# Patient Record
Sex: FEMALE | Race: WHITE | NOT HISPANIC OR LATINO | URBAN - METROPOLITAN AREA
[De-identification: names, ages, dates, MRNs, and addresses within clinical notes are randomized per-mention and may not be internally consistent; named-entity substitution may affect disease eponyms.]

---

## 2017-01-16 ENCOUNTER — DOCUMENTATION ONLY (OUTPATIENT)
Dept: TRANSPLANT | Facility: CLINIC | Age: 37
End: 2017-01-16

## 2017-01-16 ENCOUNTER — TELEPHONE (OUTPATIENT)
Dept: TRANSPLANT | Facility: CLINIC | Age: 37
End: 2017-01-16

## 2017-01-16 NOTE — TELEPHONE ENCOUNTER
I called Eula to discuss litho link results and discussion at the donor meeting on Jan 11.  It was recommended that she decrease her NA intake and be well hydrated.  Dr. Andres recommended a repeat of Litho links in 3 months.   I will send the orders and ask nutrition to send information to Eula about low NA diet.    Melyssa will ask [facility] to send results of echo.

## 2017-01-16 NOTE — PROGRESS NOTES
Sent pt email per request of coordinator, Damaris. Provided pt with low sodium diet info and also some snack ideas. Suggested tracking her current intake x 3 days via my fitness pal janette, aiming for 2000 mg/day or less. Per review of my note 12/2016, pt does not eat out often and overall nothing jumped out at me that was high in sodium in her diet. Encouraged pt to reach out to me if she has further questions.    Marian Acosta RD, LD  Outpatient Solid Organ Transplant Dietitian  [phone]

## 2017-01-24 ENCOUNTER — TELEPHONE (OUTPATIENT)
Dept: TRANSPLANT | Facility: CLINIC | Age: 37
End: 2017-01-24

## 2017-01-24 NOTE — TELEPHONE ENCOUNTER
Yee Ocampo,    I just wanted to check in today and make sure you received my Echo from [facility].  I called and asked them to fax it to you last week.  I have a few questions too.  We are going to recheck my sodium in April.  Does my sister need to be looking for another donor?  Do you test more than one person at a time?  My brother was going to be tested before me but his BMI is too high so I went first.  Can he be tested while he tries to get his BMI down or does he need to lose weight before he can start the process?  My sister has an appointment in February.  If her numbers are worse will I be able to retake the sodium test sooner?      Thank you for taking the time to read this.  My family has lots of questions and we go round and round talking about some of them and I just thought I would start with a few from a great source. J    Thank you,  Eula Iglesias  [phone]

## 2017-04-20 ENCOUNTER — DOCUMENTATION ONLY (OUTPATIENT)
Dept: TRANSPLANT | Facility: CLINIC | Age: 37
End: 2017-04-20

## 2017-04-20 NOTE — PROGRESS NOTES
Eula,  Am wondering if you had a chance to do the 24 hour urine test?  Call me if you have questions.   Damaris   [phone]

## 2017-04-25 ENCOUNTER — TELEPHONE (OUTPATIENT)
Dept: TRANSPLANT | Facility: CLINIC | Age: 37
End: 2017-04-25

## 2017-04-25 NOTE — TELEPHONE ENCOUNTER
Savannah gutierrez called to ask where they should bill echo for abnormal ekg.   I gave them billing informaion and they will bill us

## 2017-04-26 ENCOUNTER — COMMITTEE REVIEW (OUTPATIENT)
Dept: TRANSPLANT | Facility: CLINIC | Age: 37
End: 2017-04-26

## 2017-04-26 NOTE — COMMITTEE REVIEW
Abdominal Committee Review Note     Evaluation Date:   Committee Review Date: 4/26/2017    Organ being evaluated for: Kidney    Transplant Phase:   Transplant Status:     Transplant Coordinator: No matching coordinators  Transplant Surgeon:       Referring Physician: Referred Self    Primary Diagnosis:   Secondary Diagnosis:     Committee Review Members:  Nephrology Asif Andres MD   Nutrition Althea Acosta, RD   Pharmacy Lester Saxena, [facility]    - Clinical Selma Rubio, [facility], oDmonique Nelson, [facility]   Transplant Damaris Washington, Kimberly Christina, SELENA, Sophie Hallman LPN, Mónica Arboleda, SELENA, Ismael Mckeon MD, Larry Hammonds MD   Transplant Surgery Edel Robins NP       Transplant Eligibility: Acceptable Mental Health, stone former    Committee Review Decision: Declined    Relative Contraindications: Other, bilateral stones    Absolute Contraindications:     Committee Chair Larry Hammonds MD verbally attested to the committee's decision.    Committee Discussion Details: bilateral stones.  Sone former on 2 litho links

## 2017-04-26 NOTE — TELEPHONE ENCOUNTER
I called Eula and let her know that based on her litho link x2 and bilateral kidney stones she is not able to be a donor.  She was disappointed.   I offered information on how a living donor and she said she knew how to help her sister find a donor.   I thank catie her for coming forward

## 2017-04-27 ENCOUNTER — TELEPHONE (OUTPATIENT)
Dept: TRANSPLANT | Facility: CLINIC | Age: 37
End: 2017-04-27

## 2017-04-27 NOTE — TELEPHONE ENCOUNTER
Eula,  Thank you for your note.  I appreciate your feed back.  I will communicate your concerns to the nephrologists that made the decision.  We will continue to look for  a kidney for your sister.  Damaris     From: EULA MACARIO [[email or web address]]   Sent: Thursday, April 27, 2017 9:33 AM  To: Damaris Washington  Subject: Thank you    Hi Damaris,    Thank you for calling yesterday.  Probably not something that s easy to do.  It was such a shock.  I thought that I had to lower my sodium and I would be good to go.  I thought on that test result it said I went from 224 down to 116 so I was thinking it was going to be good news.  I didn t know I had 2 kidney stones.  We only talked about one tiny one and that we would give that one to my sister.  I guess I don t understand why they didn t just tell me in January that I couldn t donate.  I didn t know it was going to be this hard to hear and that extra time thinking I had a good shot probably wasn t the best.      Anyways, I think I just needed to say that so it can stop going around and around in my head.  Thank you so much for all your help the past 6 months.  I appreciate all you did.    Thanks again,  Eula Macario

## 2018-01-07 ENCOUNTER — HEALTH MAINTENANCE LETTER (OUTPATIENT)
Age: 38
End: 2018-01-07

## 2018-02-26 ENCOUNTER — ANESTHESIA EVENT (OUTPATIENT)
Dept: GASTROENTEROLOGY | Facility: CLINIC | Age: 38
End: 2018-02-26
Payer: COMMERCIAL

## 2018-02-27 ENCOUNTER — ANESTHESIA (OUTPATIENT)
Dept: GASTROENTEROLOGY | Facility: CLINIC | Age: 38
End: 2018-02-27
Payer: COMMERCIAL

## 2018-02-27 ENCOUNTER — HOSPITAL ENCOUNTER (OUTPATIENT)
Facility: CLINIC | Age: 38
Discharge: HOME | End: 2018-02-27
Attending: INTERNAL MEDICINE | Admitting: INTERNAL MEDICINE
Payer: COMMERCIAL

## 2018-02-27 ENCOUNTER — SURGERY (OUTPATIENT)
Age: 38
End: 2018-02-27

## 2018-02-27 VITALS
HEIGHT: 63 IN | SYSTOLIC BLOOD PRESSURE: 105 MMHG | WEIGHT: 178 LBS | BODY MASS INDEX: 31.54 KG/M2 | RESPIRATION RATE: 14 BRPM | OXYGEN SATURATION: 99 % | DIASTOLIC BLOOD PRESSURE: 68 MMHG

## 2018-02-27 LAB
HCG UR QL: NEGATIVE
UPPER EUS: NORMAL

## 2018-02-27 PROCEDURE — 25000128 H RX IP 250 OP 636: Performed by: INTERNAL MEDICINE

## 2018-02-27 PROCEDURE — 25000128 H RX IP 250 OP 636: Performed by: NURSE ANESTHETIST, CERTIFIED REGISTERED

## 2018-02-27 PROCEDURE — 25000125 ZZHC RX 250: Performed by: NURSE ANESTHETIST, CERTIFIED REGISTERED

## 2018-02-27 PROCEDURE — 43259 EGD US EXAM DUODENUM/JEJUNUM: CPT | Performed by: INTERNAL MEDICINE

## 2018-02-27 PROCEDURE — 40000010 ZZH STATISTIC ANES STAT CODE-CRNA PER MINUTE: Performed by: INTERNAL MEDICINE

## 2018-02-27 PROCEDURE — 81025 URINE PREGNANCY TEST: CPT | Performed by: INTERNAL MEDICINE

## 2018-02-27 PROCEDURE — 37000008 ZZH ANESTHESIA TECHNICAL FEE, 1ST 30 MIN: Performed by: INTERNAL MEDICINE

## 2018-02-27 RX ORDER — SODIUM CHLORIDE, SODIUM LACTATE, POTASSIUM CHLORIDE, CALCIUM CHLORIDE 600; 310; 30; 20 MG/100ML; MG/100ML; MG/100ML; MG/100ML
INJECTION, SOLUTION INTRAVENOUS CONTINUOUS PRN
Status: DISCONTINUED | OUTPATIENT
Start: 2018-02-27 | End: 2018-02-27 | Stop reason: HOSPADM

## 2018-02-27 RX ORDER — PROPOFOL 10 MG/ML
INJECTION, EMULSION INTRAVENOUS CONTINUOUS PRN
Status: DISCONTINUED | OUTPATIENT
Start: 2018-02-27 | End: 2018-02-27

## 2018-02-27 RX ORDER — FLUMAZENIL 0.1 MG/ML
0.2 INJECTION, SOLUTION INTRAVENOUS
Status: DISCONTINUED | OUTPATIENT
Start: 2018-02-27 | End: 2018-02-27 | Stop reason: HOSPADM

## 2018-02-27 RX ORDER — ONDANSETRON 2 MG/ML
4 INJECTION INTRAMUSCULAR; INTRAVENOUS EVERY 6 HOURS PRN
Status: DISCONTINUED | OUTPATIENT
Start: 2018-02-27 | End: 2018-02-27 | Stop reason: HOSPADM

## 2018-02-27 RX ORDER — LIDOCAINE 40 MG/G
CREAM TOPICAL
Status: DISCONTINUED | OUTPATIENT
Start: 2018-02-27 | End: 2018-02-27 | Stop reason: HOSPADM

## 2018-02-27 RX ORDER — ONDANSETRON 2 MG/ML
INJECTION INTRAMUSCULAR; INTRAVENOUS PRN
Status: DISCONTINUED | OUTPATIENT
Start: 2018-02-27 | End: 2018-02-27

## 2018-02-27 RX ORDER — NALOXONE HYDROCHLORIDE 0.4 MG/ML
.1-.4 INJECTION, SOLUTION INTRAMUSCULAR; INTRAVENOUS; SUBCUTANEOUS
Status: DISCONTINUED | OUTPATIENT
Start: 2018-02-27 | End: 2018-02-27 | Stop reason: HOSPADM

## 2018-02-27 RX ORDER — FENTANYL CITRATE 50 UG/ML
INJECTION, SOLUTION INTRAMUSCULAR; INTRAVENOUS PRN
Status: DISCONTINUED | OUTPATIENT
Start: 2018-02-27 | End: 2018-02-27

## 2018-02-27 RX ORDER — LIDOCAINE HYDROCHLORIDE 20 MG/ML
INJECTION, SOLUTION INFILTRATION; PERINEURAL PRN
Status: DISCONTINUED | OUTPATIENT
Start: 2018-02-27 | End: 2018-02-27

## 2018-02-27 RX ORDER — PROPOFOL 10 MG/ML
INJECTION, EMULSION INTRAVENOUS PRN
Status: DISCONTINUED | OUTPATIENT
Start: 2018-02-27 | End: 2018-02-27

## 2018-02-27 RX ORDER — ONDANSETRON 4 MG/1
4 TABLET, ORALLY DISINTEGRATING ORAL EVERY 6 HOURS PRN
Status: DISCONTINUED | OUTPATIENT
Start: 2018-02-27 | End: 2018-02-27 | Stop reason: HOSPADM

## 2018-02-27 RX ORDER — SODIUM CHLORIDE, SODIUM LACTATE, POTASSIUM CHLORIDE, CALCIUM CHLORIDE 600; 310; 30; 20 MG/100ML; MG/100ML; MG/100ML; MG/100ML
INJECTION, SOLUTION INTRAVENOUS CONTINUOUS PRN
Status: DISCONTINUED | OUTPATIENT
Start: 2018-02-27 | End: 2018-02-27

## 2018-02-27 RX ADMIN — ONDANSETRON 4 MG: 2 INJECTION INTRAMUSCULAR; INTRAVENOUS at 14:17

## 2018-02-27 RX ADMIN — SODIUM CHLORIDE, POTASSIUM CHLORIDE, SODIUM LACTATE AND CALCIUM CHLORIDE: 600; 310; 30; 20 INJECTION, SOLUTION INTRAVENOUS at 14:08

## 2018-02-27 RX ADMIN — SODIUM CHLORIDE, SODIUM LACTATE, POTASSIUM CHLORIDE, CALCIUM CHLORIDE 500 ML: 600; 310; 30; 20 INJECTION, SOLUTION INTRAVENOUS at 13:08

## 2018-02-27 RX ADMIN — LIDOCAINE HYDROCHLORIDE 20 MG: 20 INJECTION, SOLUTION INFILTRATION; PERINEURAL at 14:11

## 2018-02-27 RX ADMIN — PROPOFOL 125 MCG/KG/MIN: 10 INJECTION, EMULSION INTRAVENOUS at 14:12

## 2018-02-27 RX ADMIN — FENTANYL CITRATE 50 MCG: 50 INJECTION, SOLUTION INTRAMUSCULAR; INTRAVENOUS at 14:12

## 2018-02-27 RX ADMIN — LIDOCAINE HYDROCHLORIDE 80 MG: 20 INJECTION, SOLUTION INFILTRATION; PERINEURAL at 14:18

## 2018-02-27 RX ADMIN — PROPOFOL 20 MG: 10 INJECTION, EMULSION INTRAVENOUS at 14:16

## 2018-02-27 RX ADMIN — MIDAZOLAM 2 MG: 1 INJECTION INTRAMUSCULAR; INTRAVENOUS at 14:08

## 2018-02-27 ASSESSMENT — LIFESTYLE VARIABLES: TOBACCO_USE: 0

## 2018-02-27 NOTE — ANESTHESIA POSTPROCEDURE EVALUATION
Patient: Eula Iglesias    Procedure(s):  EUS WITH FINE NEEDLE ASPIRATION - Wound Class: II-Clean Contaminated    Diagnosis:PANCREAS LESION  Diagnosis Additional Information: No value filed.    Anesthesia Type:  MAC    Note:  Anesthesia Post Evaluation    Patient location during evaluation: PACU  Patient participation: Able to fully participate in evaluation  Level of consciousness: awake and alert  Pain management: satisfactory to patient  Airway patency: patent  Cardiovascular status: hemodynamically stable  Respiratory status: acceptable and unassisted  Hydration status: balanced  PONV: none     Anesthetic complications: None          Last vitals:  Vitals:    02/27/18 1440 02/27/18 1444 02/27/18 1450   BP: 115/84  155/87   Resp: 17 16 14   SpO2: 97% 98% 98%         Electronically Signed By: Jagdish Andrews MD  February 27, 2018  3:05 PM

## 2018-02-27 NOTE — ANESTHESIA CARE TRANSFER NOTE
Patient: Eula Iglesias    Procedure(s):  EUS WITH FINE NEEDLE ASPIRATION - Wound Class: II-Clean Contaminated    Diagnosis: PANCREAS LESION  Diagnosis Additional Information: No value filed.    Anesthesia Type:   MAC     Note:  Airway :Room Air  Patient transferred to:Phase II  Comments: Patient appropriate and following commands. VSS. Report given to RN.Handoff Report: Identifed the Patient, Identified the Reponsible Provider, Reviewed the pertinent medical history, Discussed the surgical course, Reviewed Intra-OP anesthesia mangement and issues during anesthesia, Set expectations for post-procedure period and Allowed opportunity for questions and acknowledgement of understanding      Vitals: (Last set prior to Anesthesia Care Transfer)    CRNA VITALS  2/27/2018 1402 - 2/27/2018 1436      2/27/2018             NIBP: 95/57    Pulse: 70    NIBP Mean: 74    Ht Rate: 70    SpO2: 99 %    Resp Rate (set): 10                Electronically Signed By: ALTHEA Lindquist CRNA  February 27, 2018  2:36 PM

## 2018-02-27 NOTE — ANESTHESIA PREPROCEDURE EVALUATION
Anesthesia Evaluation     . Pt has had prior anesthetic.     No history of anesthetic complications          ROS/MED HX    ENT/Pulmonary:      (-) tobacco use and sleep apnea   Neurologic:     (+)migraines,     Cardiovascular:         METS/Exercise Tolerance:     Hematologic:         Musculoskeletal:         GI/Hepatic:     (+) Other GI/Hepatic pancreatic mass     (-) GERD and liver disease   Renal/Genitourinary:     (+) Nephrolithiasis ,       Endo:      (-) Type II DM and thyroid disease   Psychiatric:     (+) psychiatric history anxiety      Infectious Disease:         Malignancy:         Other:                     Physical Exam  Normal systems: cardiovascular, pulmonary and dental    Airway   Mallampati: II  TM distance: >3 FB  Neck ROM: full    Dental     Cardiovascular       Pulmonary                     Anesthesia Plan      History & Physical Review  History and physical reviewed and following examination; no interval change.    ASA Status:  2 .    NPO Status:  > 8 hours    Plan for MAC Reason for MAC:  Deep or markedly invasive procedure (G8)  PONV prophylaxis:  Ondansetron (or other 5HT-3)       Postoperative Care      Consents  Anesthetic plan, risks, benefits and alternatives discussed with:  Patient and Spouse..        Procedure: Procedure(s):  COMBINED ENDOSCOPIC ULTRASOUND, ESOPHAGOSCOPY, GASTROSCOPY, DUODENOSCOPY (EGD), FINE NEEDLE ASPIRATE/BIOPSY  Preop diagnosis: PANCREAS LESION    No Known Allergies  Past Medical History:   Diagnosis Date     Abnormal Pap smear of cervix      Arthritis     prev on methotrexate, resolved with gluten free diet     Depression      Headache, migraine      Kidney stones      Post partum depression      Past Surgical History:   Procedure Laterality Date      SECTION      x 2     MAMMOPLASTY REDUCTION BILATERAL       Prior to Admission medications    Medication Sig Start Date End Date Taking? Authorizing Provider   aspirin-acetaminophen-caffeine (EXCEDRIN  MIGRAINE) 250-250-65 MG per tablet    Yes Reported, Patient   tazarotene (TAZORAC) 0.1 % topical gel    Yes Reported, Patient   Eletriptan Hydrobromide (RELPAX PO) Take 20 mg by mouth once as needed for migraine    Reported, Patient   fexofenadine (ALLERGY RELIEF) 180 MG tablet     Reported, Patient   medroxyPROGESTERone (DEPO-PROVERA) 150 MG/ML injection     Reported, Patient   eletriptan (RELPAX) 20 MG tablet  5/12/16   Reported, Patient     Current Facility-Administered Medications Ordered in Epic   Medication Dose Route Frequency Last Rate Last Dose     lidocaine 1 % 1 mL  1 mL Other Q1H PRN         lidocaine (LMX4) cream   Topical Q1H PRN         sodium chloride (PF) 0.9% PF flush 3 mL  3 mL Intracatheter Q1H PRN         sodium chloride (PF) 0.9% PF flush 3 mL  3 mL Intracatheter Q8H         lactated ringers infusion    Continuous  mL/hr at 02/27/18 1308 500 mL at 02/27/18 1308     No current [facility]-ordered outpatient prescriptions on file.     Wt Readings from Last 1 Encounters:   02/27/18 80.7 kg (178 lb)     Temp Readings from Last 1 Encounters:   12/09/16 36.5  C (97.7  F) (Oral)     BP Readings from Last 6 Encounters:   02/27/18 122/80   12/09/16 116/74   12/09/16 114/75   12/09/16 122/68     Pulse Readings from Last 4 Encounters:   12/09/16 86     Resp Readings from Last 1 Encounters:   02/27/18 16     SpO2 Readings from Last 1 Encounters:   02/27/18 97%     Recent Labs   Lab Test  12/09/16   0806   NA  142   POTASSIUM  3.7   CHLORIDE  109   CO2  21   ANIONGAP  12   GLC  89   BUN  8   CR  0.81   ROD  9.7     Recent Labs   Lab Test  12/09/16   0806   WBC  6.6   HGB  15.3   PLT  335     Recent Labs   Lab Test  12/09/16   0806   INR  1.03      RECENT LABS:   ECG:   ECHO:   CXR:                      .

## 2019-02-28 ENCOUNTER — TRANSFERRED RECORDS (OUTPATIENT)
Dept: HEALTH INFORMATION MANAGEMENT | Facility: CLINIC | Age: 39
End: 2019-02-28

## 2019-03-05 ENCOUNTER — ANESTHESIA (OUTPATIENT)
Dept: GASTROENTEROLOGY | Facility: CLINIC | Age: 39
End: 2019-03-05
Payer: COMMERCIAL

## 2019-03-05 ENCOUNTER — HOSPITAL ENCOUNTER (OUTPATIENT)
Facility: CLINIC | Age: 39
Discharge: HOME | End: 2019-03-05
Attending: INTERNAL MEDICINE | Admitting: INTERNAL MEDICINE
Payer: COMMERCIAL

## 2019-03-05 ENCOUNTER — ANESTHESIA EVENT (OUTPATIENT)
Dept: GASTROENTEROLOGY | Facility: CLINIC | Age: 39
End: 2019-03-05
Payer: COMMERCIAL

## 2019-03-05 VITALS
OXYGEN SATURATION: 98 % | BODY MASS INDEX: 31.58 KG/M2 | RESPIRATION RATE: 21 BRPM | DIASTOLIC BLOOD PRESSURE: 70 MMHG | SYSTOLIC BLOOD PRESSURE: 96 MMHG | HEART RATE: 60 BPM | HEIGHT: 64 IN | WEIGHT: 185 LBS

## 2019-03-05 LAB
HCG UR QL: NEGATIVE
UPPER EUS: NORMAL

## 2019-03-05 PROCEDURE — 81025 URINE PREGNANCY TEST: CPT | Performed by: INTERNAL MEDICINE

## 2019-03-05 PROCEDURE — 25000128 H RX IP 250 OP 636: Performed by: NURSE ANESTHETIST, CERTIFIED REGISTERED

## 2019-03-05 PROCEDURE — 40000010 ZZH STATISTIC ANES STAT CODE-CRNA PER MINUTE: Performed by: INTERNAL MEDICINE

## 2019-03-05 PROCEDURE — 25800030 ZZH RX IP 258 OP 636: Performed by: NURSE ANESTHETIST, CERTIFIED REGISTERED

## 2019-03-05 PROCEDURE — 25000125 ZZHC RX 250: Performed by: NURSE ANESTHETIST, CERTIFIED REGISTERED

## 2019-03-05 PROCEDURE — 43259 EGD US EXAM DUODENUM/JEJUNUM: CPT | Performed by: INTERNAL MEDICINE

## 2019-03-05 PROCEDURE — 37000008 ZZH ANESTHESIA TECHNICAL FEE, 1ST 30 MIN: Performed by: INTERNAL MEDICINE

## 2019-03-05 RX ORDER — FLUMAZENIL 0.1 MG/ML
0.2 INJECTION, SOLUTION INTRAVENOUS
Status: DISCONTINUED | OUTPATIENT
Start: 2019-03-05 | End: 2019-03-05 | Stop reason: HOSPADM

## 2019-03-05 RX ORDER — LIDOCAINE HYDROCHLORIDE 20 MG/ML
INJECTION, SOLUTION INFILTRATION; PERINEURAL PRN
Status: DISCONTINUED | OUTPATIENT
Start: 2019-03-05 | End: 2019-03-05

## 2019-03-05 RX ORDER — LIDOCAINE 40 MG/G
CREAM TOPICAL
Status: DISCONTINUED | OUTPATIENT
Start: 2019-03-05 | End: 2019-03-05 | Stop reason: HOSPADM

## 2019-03-05 RX ORDER — PROPOFOL 10 MG/ML
INJECTION, EMULSION INTRAVENOUS PRN
Status: DISCONTINUED | OUTPATIENT
Start: 2019-03-05 | End: 2019-03-05

## 2019-03-05 RX ORDER — NALOXONE HYDROCHLORIDE 0.4 MG/ML
.1-.4 INJECTION, SOLUTION INTRAMUSCULAR; INTRAVENOUS; SUBCUTANEOUS
Status: DISCONTINUED | OUTPATIENT
Start: 2019-03-05 | End: 2019-03-05 | Stop reason: HOSPADM

## 2019-03-05 RX ORDER — SODIUM CHLORIDE, SODIUM LACTATE, POTASSIUM CHLORIDE, CALCIUM CHLORIDE 600; 310; 30; 20 MG/100ML; MG/100ML; MG/100ML; MG/100ML
INJECTION, SOLUTION INTRAVENOUS CONTINUOUS PRN
Status: DISCONTINUED | OUTPATIENT
Start: 2019-03-05 | End: 2019-03-05

## 2019-03-05 RX ORDER — ONDANSETRON 2 MG/ML
INJECTION INTRAMUSCULAR; INTRAVENOUS PRN
Status: DISCONTINUED | OUTPATIENT
Start: 2019-03-05 | End: 2019-03-05

## 2019-03-05 RX ORDER — PROPOFOL 10 MG/ML
INJECTION, EMULSION INTRAVENOUS CONTINUOUS PRN
Status: DISCONTINUED | OUTPATIENT
Start: 2019-03-05 | End: 2019-03-05

## 2019-03-05 RX ADMIN — LIDOCAINE HYDROCHLORIDE 80 MG: 20 INJECTION, SOLUTION INFILTRATION; PERINEURAL at 07:59

## 2019-03-05 RX ADMIN — MIDAZOLAM 2 MG: 1 INJECTION INTRAMUSCULAR; INTRAVENOUS at 07:56

## 2019-03-05 RX ADMIN — PROPOFOL 120 MCG/KG/MIN: 10 INJECTION, EMULSION INTRAVENOUS at 08:01

## 2019-03-05 RX ADMIN — PROPOFOL 20 MG: 10 INJECTION, EMULSION INTRAVENOUS at 08:01

## 2019-03-05 RX ADMIN — SODIUM CHLORIDE, POTASSIUM CHLORIDE, SODIUM LACTATE AND CALCIUM CHLORIDE: 600; 310; 30; 20 INJECTION, SOLUTION INTRAVENOUS at 07:56

## 2019-03-05 RX ADMIN — PROPOFOL 20 MG: 10 INJECTION, EMULSION INTRAVENOUS at 08:08

## 2019-03-05 RX ADMIN — ONDANSETRON 4 MG: 2 INJECTION INTRAMUSCULAR; INTRAVENOUS at 08:10

## 2019-03-05 RX ADMIN — PROPOFOL 30 MG: 10 INJECTION, EMULSION INTRAVENOUS at 08:05

## 2019-03-05 RX ADMIN — PROPOFOL 30 MG: 10 INJECTION, EMULSION INTRAVENOUS at 07:59

## 2019-03-05 ASSESSMENT — LIFESTYLE VARIABLES: TOBACCO_USE: 0

## 2019-03-05 ASSESSMENT — MIFFLIN-ST. JEOR: SCORE: 1508.12

## 2019-03-05 NOTE — ANESTHESIA CARE TRANSFER NOTE
Patient: Eula Iglesias    Procedure(s):  COMBINED ENDOSCOPIC ULTRASOUND, ESOPHAGOSCOPY, GASTROSCOPY, DUODENOSCOPY (EGD)(MAC)    Diagnosis: FOLLOW UP FOR PANCREATIC CYST  Diagnosis Additional Information: No value filed.    Anesthesia Type:   MAC     Note:  Airway :Room Air  Patient transferred to:PACU  Comments: Transferred to PACU, spontaneous RR, on room air.  Monitors and alarms on and functioning, VSS, patient awake and comfortable.  Report to PACU RN.Handoff Report: Identifed the Patient, Identified the Reponsible Provider, Reviewed the pertinent medical history, Discussed the surgical course, Reviewed Intra-OP anesthesia mangement and issues during anesthesia, Set expectations for post-procedure period and Allowed opportunity for questions and acknowledgement of understanding      Vitals: (Last set prior to Anesthesia Care Transfer)    CRNA VITALS  3/5/2019 0748 - 3/5/2019 0823      3/5/2019             NIBP:  86/64  (Abnormal)     NIBP Mean:  73                Electronically Signed By: ALTHEA Cardenas CRNA  March 5, 2019  8:23 AM

## 2019-03-05 NOTE — ANESTHESIA POSTPROCEDURE EVALUATION
Patient: Eula Iglesias    Procedure(s):  COMBINED ENDOSCOPIC ULTRASOUND, ESOPHAGOSCOPY, GASTROSCOPY, DUODENOSCOPY (EGD)(MAC)    Diagnosis:FOLLOW UP FOR PANCREATIC CYST  Diagnosis Additional Information: No value filed.    Anesthesia Type:  MAC    Note:  Anesthesia Post Evaluation    Patient location during evaluation: PACU  Patient participation: Able to fully participate in evaluation  Level of consciousness: awake and alert  Pain management: adequate  Airway patency: patent  Cardiovascular status: acceptable  Respiratory status: acceptable and unassisted  Hydration status: acceptable  PONV: none             Last vitals:  Vitals:    03/05/19 0900 03/05/19 0901 03/05/19 0904   BP: 96/70     Pulse: 60     Resp:  17 21   SpO2:  99% 98%         Electronically Signed By: Kate Lake MD  March 5, 2019  9:14 AM

## 2019-03-05 NOTE — ANESTHESIA PREPROCEDURE EVALUATION
Anesthesia Pre-Procedure Evaluation    Patient: Eula Iglesias   MRN: [id number] : 1980          Preoperative Diagnosis: FOLLOW UP FOR PANCREATIC CYST    Procedure(s):  COMBINED ENDOSCOPIC ULTRASOUND, ESOPHAGOSCOPY, GASTROSCOPY, DUODENOSCOPY (EGD)(MAC)    Past Medical History:   Diagnosis Date     Abnormal Pap smear of cervix      Arthritis     prev on methotrexate, resolved with gluten free diet     Depression      Headache, migraine      Kidney stones      Post partum depression      Past Surgical History:   Procedure Laterality Date      SECTION      x 2     ESOPHAGOSCOPY, GASTROSCOPY, DUODENOSCOPY (EGD), COMBINED N/A 2018    Procedure: COMBINED ENDOSCOPIC ULTRASOUND, ESOPHAGOSCOPY, GASTROSCOPY, DUODENOSCOPY (EGD);  EUS WITH FINE NEEDLE ASPIRATION;  Surgeon: Jina Kelley MD;  Location:  GI     MAMMOPLASTY REDUCTION BILATERAL         Anesthesia Evaluation     . Pt has had prior anesthetic.     No history of anesthetic complications          ROS/MED HX    ENT/Pulmonary:      (-) tobacco use and sleep apnea   Neurologic:     (+)migraines,     Cardiovascular:         METS/Exercise Tolerance:     Hematologic:         Musculoskeletal:         GI/Hepatic:     (+) Other GI/Hepatic pancreatic mass     (-) GERD and liver disease   Renal/Genitourinary:     (+) Nephrolithiasis ,       Endo:  - neg endo ROS    (-) Type II DM and thyroid disease   Psychiatric:     (+) psychiatric history anxiety      Infectious Disease:         Malignancy:         Other:                          Physical Exam  Normal systems: cardiovascular and pulmonary    Airway   Mallampati: II  TM distance: >3 FB  Neck ROM: full    Dental   (+) chipped    Cardiovascular       Pulmonary             Lab Results   Component Value Date    WBC 6.6 2016    HGB 15.3 2016    HCT 45.1 2016     2016     2016    POTASSIUM 3.7 2016    CHLORIDE 109 2016    CO2 21 2016     "BUN 8 12/09/2016    CR 0.81 12/09/2016    GLC 89 12/09/2016    ROD 9.7 12/09/2016    PHOS 2.8 12/09/2016    ALBUMIN 4.3 12/09/2016    PROTTOTAL 8.4 12/09/2016    ALT 21 12/09/2016    AST 18 12/09/2016    ALKPHOS 77 12/09/2016    BILITOTAL 0.6 12/09/2016    PTT 29 12/09/2016    INR 1.03 12/09/2016    HCG Negative 02/27/2018       Preop Vitals  BP Readings from Last 3 Encounters:   02/27/18 105/68   12/09/16 116/74   12/09/16 114/75    Pulse Readings from Last 3 Encounters:   12/09/16 86      Resp Readings from Last 3 Encounters:   02/27/18 14   12/09/16 16    SpO2 Readings from Last 3 Encounters:   02/27/18 99%   12/09/16 99%      Temp Readings from Last 1 Encounters:   12/09/16 36.5  C (97.7  F) (Oral)    Ht Readings from Last 1 Encounters:   02/27/18 1.6 m (5' 3\")      Wt Readings from Last 1 Encounters:   02/27/18 80.7 kg (178 lb)    Estimated body mass index is 31.53 kg/m  as calculated from the following:    Height as of 2/27/18: 1.6 m (5' 3\").    Weight as of 2/27/18: 80.7 kg (178 lb).       Anesthesia Plan      History & Physical Review  History and physical reviewed and following examination; no interval change.    ASA Status:  2 .    NPO Status:  > 8 hours    Plan for MAC Reason for MAC:  Deep or markedly invasive procedure (G8)  PONV prophylaxis:  Ondansetron (or other 5HT-3)       Postoperative Care  Postoperative pain management:  Multi-modal analgesia.      Consents  Anesthetic plan, risks, benefits and alternatives discussed with:  Patient..                 Kate Lake MD  "

## 2020-03-10 ENCOUNTER — HEALTH MAINTENANCE LETTER (OUTPATIENT)
Age: 40
End: 2020-03-10

## 2020-12-27 ENCOUNTER — HEALTH MAINTENANCE LETTER (OUTPATIENT)
Age: 40
End: 2020-12-27

## 2021-04-24 ENCOUNTER — HEALTH MAINTENANCE LETTER (OUTPATIENT)
Age: 41
End: 2021-04-24

## 2021-10-09 ENCOUNTER — HEALTH MAINTENANCE LETTER (OUTPATIENT)
Age: 41
End: 2021-10-09

## 2022-05-16 ENCOUNTER — HEALTH MAINTENANCE LETTER (OUTPATIENT)
Age: 42
End: 2022-05-16

## 2022-09-11 ENCOUNTER — HEALTH MAINTENANCE LETTER (OUTPATIENT)
Age: 42
End: 2022-09-11

## 2023-06-03 ENCOUNTER — HEALTH MAINTENANCE LETTER (OUTPATIENT)
Age: 43
End: 2023-06-03

## 2024-02-24 ENCOUNTER — HEALTH MAINTENANCE LETTER (OUTPATIENT)
Age: 44
End: 2024-02-24

## (undated) DEVICE — SOL WATER IRRIG 1000ML BOTTLE 2F7114

## (undated) RX ORDER — FENTANYL CITRATE 50 UG/ML
INJECTION, SOLUTION INTRAMUSCULAR; INTRAVENOUS
Status: DISPENSED
Start: 2018-02-27